# Patient Record
(demographics unavailable — no encounter records)

---

## 2024-10-25 NOTE — DISCUSSION/SUMMARY
[FreeTextEntry1] : 20 month old w/ left arm injury. well appearing w/ FROM, no tenderness when distracted.  -send for xray to r/o hairline fracture -NSAIDs and rest - If new or worsening symptoms or parental concern - return to office or ED.

## 2024-10-25 NOTE — HISTORY OF PRESENT ILLNESS
[de-identified] : Fell down from the bed last week and left elbow pain thereon  [FreeTextEntry6] : fell off bed last week onto carpet. was favoring his arm. gave motrin w/ improvement but still w/ occasional pain, mostly when trying to full extend to put clothes on. sometimes more clingy and occasionally winces with touch. otherwise seems to be using his arm w/o issue when feeding or playing.

## 2024-10-29 NOTE — DEVELOPMENTAL MILESTONES
[Normal Development] : Normal Development [Engages with others for play] : engages with others for play [Help dress and undress self] : help dress and undress self [Points to pictures in book] : points to pictures in book [Points to object of interest to] : points to object of interest to draw attention to it [Turns and looks at adult if] : turns and looks at adult if something new happens [Uses 6 to 10 words other than] : uses 6 to 10 words other than names [Walks up with 2 feet per step] : walks up with 2 feet per step with hand held [Sits in small chair] : sits in small chair [Scribbles spontaneously] : scribbles spontaneously [None] : none [Passed] : passed

## 2024-10-30 NOTE — HISTORY OF PRESENT ILLNESS
[Mother] : mother [Cow's milk (Ounces per day ___)] : consumes [unfilled] oz of Cow's milk per day [Fruit] : fruit [Vegetables] : vegetables [Meat] : meat [Finger Foods] : finger foods [Table food] : table food [Normal] : Normal [In crib] : In crib [Wakes up at night] : Wakes up at night [Sippy cup use] : Sippy cup use [Brushing teeth] : Brushing teeth [None] : Primary Fluoride Source: None [Playtime] : Playtime  [No] : No cigarette smoke exposure [Car seat in back seat] : Car seat in back seat [NO] : No [Influenza] : Influenza [FreeTextEntry7] : Fell onto left arm last week, XR elbow negative. Mother says she feels his arm may still hurt when putting shirts on. He has been moving his arms equally, no redness or swelling. Mother also notes other siblings have been coughing. Pt has not been coughing.  [de-identified] : does not drink cow's milk consistently, continues to breastfeed  [de-identified] : Has dentist appointment  [de-identified] : Due for pentacel, declined flu vaccine

## 2024-10-30 NOTE — PHYSICAL EXAM
[Alert] : alert [No Acute Distress] : no acute distress [Normocephalic] : normocephalic [Anterior [place] Closed] : anterior fontanelle closed [Red Reflex Bilateral] : red reflex bilateral [PERRL] : PERRL [Normally Placed Ears] : normally placed ears [Auricles Well Formed] : auricles well formed [Clear Tympanic membranes with present light reflex and bony landmarks] : clear tympanic membranes with present light reflex and bony landmarks [No Discharge] : no discharge [Palate Intact] : palate intact [Tooth Eruption] : tooth eruption  [Supple, full passive range of motion] : supple, full passive range of motion [Symmetric Chest Rise] : symmetric chest rise [Clear to Auscultation Bilaterally] : clear to auscultation bilaterally [Regular Rate and Rhythm] : regular rate and rhythm [S1, S2 present] : S1, S2 present [No Murmurs] : no murmurs [Soft] : soft [NonTender] : non tender [Non Distended] : non distended [Normoactive Bowel Sounds] : normoactive bowel sounds [No Hepatomegaly] : no hepatomegaly [No Splenomegaly] : no splenomegaly [Santy 1] : Santy 1 [Testicles Descended Bilaterally] : testicles descended bilaterally [Symmetric Buttocks Creases] : symmetric buttocks creases [Cranial Nerves Grossly Intact] : cranial nerves grossly intact [No Rash or Lesions] : no rash or lesions [de-identified] : no pain with left elbow palpation; no left arm or elbow swelling or erythema

## 2024-10-30 NOTE — HISTORY OF PRESENT ILLNESS
[Mother] : mother [Cow's milk (Ounces per day ___)] : consumes [unfilled] oz of Cow's milk per day [Fruit] : fruit [Vegetables] : vegetables [Meat] : meat [Finger Foods] : finger foods [Table food] : table food [Normal] : Normal [In crib] : In crib [Wakes up at night] : Wakes up at night [Sippy cup use] : Sippy cup use [Brushing teeth] : Brushing teeth [None] : Primary Fluoride Source: None [Playtime] : Playtime  [No] : No cigarette smoke exposure [Car seat in back seat] : Car seat in back seat [NO] : No [Influenza] : Influenza [FreeTextEntry7] : Fell onto left arm last week, XR elbow negative. Mother says she feels his arm may still hurt when putting shirts on. He has been moving his arms equally, no redness or swelling. Mother also notes other siblings have been coughing. Pt has not been coughing.  [de-identified] : does not drink cow's milk consistently, continues to breastfeed  [de-identified] : Has dentist appointment  [de-identified] : Due for pentacel, declined flu vaccine

## 2024-10-30 NOTE — DISCUSSION/SUMMARY
[Normal Growth] : growth [Normal Development] : development [None] : No known medical problems [No Elimination Concerns] : elimination [No Feeding Concerns] : feeding [No Skin Concerns] : skin [Family Support] : family support [Child Development and Behavior] : child development and behavior [Language Promotion/Hearing] : language promotion/hearing [Toliet Training Readiness] : toliet training readiness [No Medications] : ~He/She~ is not on any medications [Mother] : mother [] : The components of the vaccine(s) to be administered today are listed in the plan of care. The disease(s) for which the vaccine(s) are intended to prevent and the risks have been discussed with the caretaker.  The risks are also included in the appropriate vaccination information statements which have been provided to the patient's caregiver.  The caregiver has given consent to vaccinate. [de-identified] : picky eating  [FreeTextEntry1] : 18mo M here for WCC. VS and physical exam with no concerning findings. Per mother, he has been a picky eater and biting on non-food items, such as hair and ice. Will obtain iron studies to assess for iron deficiency. Otherwise developing appropriately.   #Nutrition  - Continue to offer varied diet of solids  - Introduce consistency in daily meal routine   #R/o iron deficiency  - CBC, lead, iron studies   #Health Maintenance  - MCHAT passed  - Vaccines given today: Pentacel; declined flu vaccine  - RTC in 4 months for 3yo WCC or sooner as needed

## 2024-10-30 NOTE — PHYSICAL EXAM
[Alert] : alert [No Acute Distress] : no acute distress [Normocephalic] : normocephalic [Anterior [place] Closed] : anterior fontanelle closed [Red Reflex Bilateral] : red reflex bilateral [PERRL] : PERRL [Normally Placed Ears] : normally placed ears [Auricles Well Formed] : auricles well formed [Clear Tympanic membranes with present light reflex and bony landmarks] : clear tympanic membranes with present light reflex and bony landmarks [No Discharge] : no discharge [Palate Intact] : palate intact [Tooth Eruption] : tooth eruption  [Supple, full passive range of motion] : supple, full passive range of motion [Symmetric Chest Rise] : symmetric chest rise [Clear to Auscultation Bilaterally] : clear to auscultation bilaterally [Regular Rate and Rhythm] : regular rate and rhythm [S1, S2 present] : S1, S2 present [No Murmurs] : no murmurs [Soft] : soft [NonTender] : non tender [Non Distended] : non distended [Normoactive Bowel Sounds] : normoactive bowel sounds [No Hepatomegaly] : no hepatomegaly [No Splenomegaly] : no splenomegaly [Santy 1] : Santy 1 [Testicles Descended Bilaterally] : testicles descended bilaterally [Symmetric Buttocks Creases] : symmetric buttocks creases [Cranial Nerves Grossly Intact] : cranial nerves grossly intact [No Rash or Lesions] : no rash or lesions [de-identified] : no pain with left elbow palpation; no left arm or elbow swelling or erythema

## 2024-10-30 NOTE — DISCUSSION/SUMMARY
[Normal Growth] : growth [Normal Development] : development [None] : No known medical problems [No Elimination Concerns] : elimination [No Feeding Concerns] : feeding [No Skin Concerns] : skin [Family Support] : family support [Child Development and Behavior] : child development and behavior [Language Promotion/Hearing] : language promotion/hearing [Toliet Training Readiness] : toliet training readiness [No Medications] : ~He/She~ is not on any medications [Mother] : mother [] : The components of the vaccine(s) to be administered today are listed in the plan of care. The disease(s) for which the vaccine(s) are intended to prevent and the risks have been discussed with the caretaker.  The risks are also included in the appropriate vaccination information statements which have been provided to the patient's caregiver.  The caregiver has given consent to vaccinate. [de-identified] : picky eating  [FreeTextEntry1] : 18mo M here for WCC. VS and physical exam with no concerning findings. Per mother, he has been a picky eater and biting on non-food items, such as hair and ice. Will obtain iron studies to assess for iron deficiency. Otherwise developing appropriately.   #Nutrition  - Continue to offer varied diet of solids  - Introduce consistency in daily meal routine   #R/o iron deficiency  - CBC, lead, iron studies   #Health Maintenance  - MCHAT passed  - Vaccines given today: Pentacel; declined flu vaccine  - RTC in 4 months for 1yo WCC or sooner as needed

## 2024-11-26 NOTE — DISCUSSION/SUMMARY
[FreeTextEntry1] : Well appearing child with URI Symptomatic therapy as needed including acetaminophen or ibuprofen for fever. Increase fluids Avoid airway irritants Discussed use/avoidance of cold symptom medications Call if no better 3-5 days, sooner for change/concerns/wheeze/distress recheck prn

## 2024-11-26 NOTE — HISTORY OF PRESENT ILLNESS
[de-identified] : Croup over a week [FreeTextEntry6] : c/o cough , runny nose x 3 days no fever eating well, normal UOP and bM s

## 2024-12-09 NOTE — HISTORY OF PRESENT ILLNESS
[de-identified] : FELL AND HIT HEAD ON CHAIR YESTERDAY [FreeTextEntry6] : Yesterday was running around, fell and hit chair. Denies loc, vomiting, or lethargy. Tolerating regular diet. Normal UOP.  Smiling and playful in office.

## 2024-12-09 NOTE — PHYSICAL EXAM
[NL] : warm, clear [FreeTextEntry2] : +mild non-tender swelling center of forehead +No bogginess felt on exam

## 2024-12-09 NOTE — DISCUSSION/SUMMARY
[FreeTextEntry1] : Discussed with patient/family/ nature of head injury  Normal physical exam and non-concerning history making intracranial pathology very unlikely  Discussed use of analgesic medication if HA is present  Recheck in office PRN  May return to activities/sports on Monday

## 2025-02-18 NOTE — PHYSICAL EXAM
[Clear] : right tympanic membrane clear [Purulent Effusion] : purulent effusion [Rhonchi] : rhonchi [NL] : warm, clear

## 2025-02-18 NOTE — DISCUSSION/SUMMARY
[FreeTextEntry1] : 2 year old w/ left AOM in setting of uri. well appearing w/ normal o2 -ns nebs prn - Complete antibiotic course. Potential side effect of antibiotics includes but not limited to diarrhea. Provide ibuprofen as needed for pain or fever. If no improvement within 48 hours return for re-evaluation. - Recommended supportive care, including antipyretics, fluids, nasal suction, use of humidifiers, and elevating head w/ extra pillow for postnasal drip.  - If new or worsening symptoms or parental concern - return to office or ED.